# Patient Record
Sex: MALE | Race: BLACK OR AFRICAN AMERICAN
[De-identification: names, ages, dates, MRNs, and addresses within clinical notes are randomized per-mention and may not be internally consistent; named-entity substitution may affect disease eponyms.]

---

## 2023-12-22 ENCOUNTER — APPOINTMENT (OUTPATIENT)
Dept: ORTHOPEDIC SURGERY | Facility: CLINIC | Age: 63
End: 2023-12-22
Payer: COMMERCIAL

## 2023-12-22 VITALS — HEIGHT: 69 IN | BODY MASS INDEX: 27.11 KG/M2 | WEIGHT: 183 LBS

## 2023-12-22 DIAGNOSIS — Z82.49 FAMILY HISTORY OF ISCHEMIC HEART DISEASE AND OTHER DISEASES OF THE CIRCULATORY SYSTEM: ICD-10-CM

## 2023-12-22 DIAGNOSIS — M77.8 OTHER ENTHESOPATHIES, NOT ELSEWHERE CLASSIFIED: ICD-10-CM

## 2023-12-22 DIAGNOSIS — Z83.3 FAMILY HISTORY OF DIABETES MELLITUS: ICD-10-CM

## 2023-12-22 DIAGNOSIS — Z78.9 OTHER SPECIFIED HEALTH STATUS: ICD-10-CM

## 2023-12-22 PROCEDURE — 73130 X-RAY EXAM OF HAND: CPT | Mod: RT

## 2023-12-22 PROCEDURE — 99203 OFFICE O/P NEW LOW 30 MIN: CPT

## 2023-12-22 NOTE — PLAN
[TextEntry] : The patient was advised of the diagnosis.  The natural history of the pathology was explained in full to the patient in layman's terms.  All questions were answered.  The risks and benefits of surgical and nonsurgical treatment alternatives were explained in full to the patient.   Massage the area.  Recommend over the counter medications on as needed basis. If symptoms worsen, will return for cortisone injection.

## 2023-12-22 NOTE — HISTORY OF PRESENT ILLNESS
[0] : 0 [3] : 3 [Tightness] : tightness [Full time] : Work status: full time [de-identified] : 12/22/23  Initial visit for this 63 year old male RHD c/o spon. onset of pain and locking rt middle finger x last 3 weeks duration. Went to UC x 3 weeks ago and had an xray which was negative. No N/T. [] : Post Surgical Visit: no [FreeTextEntry1] : right hand/ right wrist/ right middle finger  [FreeTextEntry5] : pt slammed his hand in a drawer when he was closing it around 4 weeks ago, pain progressed around 2 weeks ago, pt has close to no pain most of the time, when waking up he is stiff and sore, pt has discomfort when pressing on the middle of his right middle finger   pt went to urgent care at [facility] in [place]  on 12/1/23 where they had xrays taken with no significant findings  pt has no hx of right hand injuries  [de-identified] : none  [de-identified] : xrays  [de-identified] :  for [facility]

## 2023-12-22 NOTE — PHYSICAL EXAM
[Normal Mood and Affect] : normal mood and affect [Able to Communicate] : able to communicate [Well Developed] : well developed [Well Nourished] : well nourished [3rd] : 3rd [A1-Pulley] : A1-pulley [5___] : finger abductor 5[unfilled]/5 [] : negative triggering [Right] : right hand [There are no fractures, subluxations or dislocations. No significant abnormalities are seen] : There are no fractures, subluxations or dislocations. No significant abnormalities are seen

## 2023-12-22 NOTE — DISCUSSION/SUMMARY
[de-identified] : "Written by Mahi Monteiro, acting as Scribe for Kartik Dao MD."  Dr. Dao -  The documentation recorded by the scribe accurately reflects the service I personally performed and the decisions made by me.